# Patient Record
(demographics unavailable — no encounter records)

---

## 2025-03-14 NOTE — HISTORY OF PRESENT ILLNESS
[de-identified] : 71F presents with joshua thumb pain. States having pain in thumbs for about 1 year. No injury, onset pain. Sometimes pain level reaches 10/10.   8/9/22: f/u bilateral thumb pain. Reports CSI from 4/19/22 helped until approx 1 month ago. Admits to having pain again - would like to proceed with another round of CSI in both thumbs. Denies numbness/tingling. Recently fx lt hip/femur.  1/10/23: f/u  bilateral thumbs. R > L. Reports wanting to discuss next steps. CSI from 4/19/22 did help but still always had residual pain. Has been using cane for ambulation due to hip fx making the pain worse.   4/4/23: f/u bilateral thumbs, R > L . Is scheduled for sx on 4/6/23 for the RT - wants to discuss surgery again and go through the process. Also curious about an injection in the LT.   5/30/23: 1st POV RIGHT CMC ARTHROPLASTY (5/18/23), states swelling has gone down, pain level 0/10. states doing well since surgery.   6/27/23: 2nd POV RIGHT CMC ARTHROPLASTY (5/18/23), patient reports doing well, admits to minimal pain. Currently in PT 2x a week. Denies numbness/ tingling.  8/29/23; 3rd POV RIGHT CMC ARTHROPLASTY (5/18/23), reports she stopped PT due to constant middle finger pain.  12/5/23:  Xiomara presents today for LEFT thumb pain. Last CSI on 3/18/23 reports injection helped significantly. Patient is here today for another injection.   3/13/24: Bilateral thumb f/u, reports experiencing sharp pain, admits CSI last visit. Attends OT with significant relief.   06/21/24: f/u bilateral thumbs, s/p right thumb CMC arthroplasty [05/18/23]. Patient reports that her pain has returned in her left thumb. Patient had a CSI in her left thumb on 03/21/24 and reports it provided relief until approx. mid June 2024. Denies numbness/tingling.   03/14/25: Patient presents for a follow up for persistent left thumb pain and locking, seeking administration of a cortisone injection today. Additionally, the patient complaining of right elbow pain since October 2024, previously fractured 4-5 years ago due to a fall on concrete, treated non-operatively.

## 2025-03-14 NOTE — IMAGING
[de-identified] : Right elbow with +ttp at lateral epicondyle, worse with resisted wrist extension. Right thumb with no swelling, incision well healed - no erythema, nor drainage. -ttp at thumb MP (stable), IP A1 pulley and radial styloid. Able to make fist, oppose thumb to small finger and abduct fingers. Sensation intact throughout. <2sec cap refill.  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 No fracture nor dislocation. Right elbow radiographs with no fracture nor dislocation. Minimal arthrosis. (3-view)  Left thumb with no swelling, +ttp at thumb CMC, +grind. -ttp at thumb MP (stable), IP A1 pulley and radial styloid. Able to make fist, oppose thumb to small finger and abduct fingers. Sensation intact throughout. <2sec cap refill. Also with subluxing ulnar nerve.   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 No fracture nor dislocation.

## 2025-03-14 NOTE — ASSESSMENT
[FreeTextEntry1] : Right Lateral Epicondylitis The diagnosis of lateral epicondylitis and treatment options were discussed at length with the patient today.  I discussed that in terms of the natural history of the condition, it can sometimes take many months to improve.  I discussed treatment options including observation, activity modification including avoiding lifting with the hand pronated and instead lifting with the hand supinated, oral anti-inflammatories, hand therapy, counterforce brace, and steroid injection.  I discussed that for the steroid injection, the literature proves this to be no better than a placebo, however, it is still a potential option for the patient.  Furthermore, if this condition is recalcitrant, I discussed obtaining an MRI scan to assess both the lateral epicondyle as well as the radiocapitellar joint and specifically for a potential plica that could be impinging on the radiocapitellar joint. All of the patient's questions were answered to their satisfaction. After discussion of risks/benefits, including glucose intolerance in the diabetic population, patient elected to proceed with CSI to the elbow. ***Procedure 1 - 1.0cc 1% lidocaine + 1.0cc 40mg/cc Kenalog administered to the right lateral epicondyle at point of maximal tenderness following sterilization with Betadine. Patient tolerated this well.    Bilateral thumb CMC arthritis - reviewed the radiographs and pathoanatomy with patient. Discussed the management ladder including NSAIDs prn, bracing (MetaGrip), OT, CSI and arthroplasty. After discussion of risks/benefits, including glucose intolerance in the diabetic population, patient elected to proceed with CSI to the thumb CMC joint. ***Procedure 1 - 0.5cc 1% lidocaine + 0.5cc 40mg/cc Kenalog administered to the right thumb CMC joint following sterilization with Betadine. Patient tolerated this well.  s/p right thumb CMC arthroplasty and partial trapezoidectomy [5-18-23] - progressing well postop. OT for ROM and strengthening, out of brace. Mild pain at base of thumb.  Right middle finger trigger - Discussed with patient the pathoanatomy of trigger and options going forward. Risks/benefits discussed as well as natural progression that injection will provide some relief but symptoms may recur. Discussed that pain may worsen in coming days but will subside. Discussed that we can repeat an injection or that operative intervention may be indicated down the line.  F/u 3 months (consider left CMC arthroplasty)

## 2025-03-14 NOTE — IMAGING
[de-identified] : Right elbow with +ttp at lateral epicondyle, worse with resisted wrist extension. Right thumb with no swelling, incision well healed - no erythema, nor drainage. -ttp at thumb MP (stable), IP A1 pulley and radial styloid. Able to make fist, oppose thumb to small finger and abduct fingers. Sensation intact throughout. <2sec cap refill.  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 No fracture nor dislocation. Right elbow radiographs with no fracture nor dislocation. Minimal arthrosis. (3-view)  Left thumb with no swelling, +ttp at thumb CMC, +grind. -ttp at thumb MP (stable), IP A1 pulley and radial styloid. Able to make fist, oppose thumb to small finger and abduct fingers. Sensation intact throughout. <2sec cap refill. Also with subluxing ulnar nerve.   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 No fracture nor dislocation.

## 2025-03-14 NOTE — HISTORY OF PRESENT ILLNESS
[de-identified] : 71F presents with joshua thumb pain. States having pain in thumbs for about 1 year. No injury, onset pain. Sometimes pain level reaches 10/10.   8/9/22: f/u bilateral thumb pain. Reports CSI from 4/19/22 helped until approx 1 month ago. Admits to having pain again - would like to proceed with another round of CSI in both thumbs. Denies numbness/tingling. Recently fx lt hip/femur.  1/10/23: f/u  bilateral thumbs. R > L. Reports wanting to discuss next steps. CSI from 4/19/22 did help but still always had residual pain. Has been using cane for ambulation due to hip fx making the pain worse.   4/4/23: f/u bilateral thumbs, R > L . Is scheduled for sx on 4/6/23 for the RT - wants to discuss surgery again and go through the process. Also curious about an injection in the LT.   5/30/23: 1st POV RIGHT CMC ARTHROPLASTY (5/18/23), states swelling has gone down, pain level 0/10. states doing well since surgery.   6/27/23: 2nd POV RIGHT CMC ARTHROPLASTY (5/18/23), patient reports doing well, admits to minimal pain. Currently in PT 2x a week. Denies numbness/ tingling.  8/29/23; 3rd POV RIGHT CMC ARTHROPLASTY (5/18/23), reports she stopped PT due to constant middle finger pain.  12/5/23:  Xiomara presents today for LEFT thumb pain. Last CSI on 3/18/23 reports injection helped significantly. Patient is here today for another injection.   3/13/24: Bilateral thumb f/u, reports experiencing sharp pain, admits CSI last visit. Attends OT with significant relief.   06/21/24: f/u bilateral thumbs, s/p right thumb CMC arthroplasty [05/18/23]. Patient reports that her pain has returned in her left thumb. Patient had a CSI in her left thumb on 03/21/24 and reports it provided relief until approx. mid June 2024. Denies numbness/tingling.   03/14/25: Patient presents for a follow up for persistent left thumb pain and locking, seeking administration of a cortisone injection today. Additionally, the patient complaining of right elbow pain since October 2024, previously fractured 4-5 years ago due to a fall on concrete, treated non-operatively.